# Patient Record
Sex: FEMALE | Race: WHITE | Employment: FULL TIME | URBAN - METROPOLITAN AREA
[De-identification: names, ages, dates, MRNs, and addresses within clinical notes are randomized per-mention and may not be internally consistent; named-entity substitution may affect disease eponyms.]

---

## 2017-08-03 ENCOUNTER — OFFICE VISIT (OUTPATIENT)
Dept: ORTHOPEDICS | Facility: CLINIC | Age: 32
End: 2017-08-03

## 2017-08-03 VITALS — HEIGHT: 63 IN | BODY MASS INDEX: 22.68 KG/M2 | WEIGHT: 128 LBS

## 2017-08-03 DIAGNOSIS — M22.2X2 PATELLOFEMORAL PAIN SYNDROME OF LEFT KNEE: Primary | ICD-10-CM

## 2017-08-03 NOTE — LETTER
Date:August 4, 2017      Patient was self referred, no letter generated. Do not send.        [facility] Health Information

## 2017-08-03 NOTE — LETTER
"  8/3/2017      RE: Mackenzie Echevarria  [address]    [facility] [address]       Sports Medicine Clinic Visit    PCP: No Ref-Primary, Physician    Mackenzie Echevarria is a 31 year old female who is seen  as self referral presenting with left knee pain.     Injury: She was on a run, two weeks ago, when her shoe came untied. She tried to bend down to tie it very quickly so she wouldn't lose her time on her GPS.  She had ant knee pain after the run.    Location of Pain: left knee  Duration of Pain: 2 week(s)  Pain is better with: Ice and Vicks  Pain is worse with: Pain is constant, worse with walking and stairs  Additional Features:   Treatment so far consists of: Ice and Vicks  Prior History of related problems:     Ht 5' 3\" (1.6 m)  Wt 128 lb (58.1 kg)  BMI 22.67 kg/m2         PMH:  No past medical history on file.    Active problem list:  There is no problem list on file for this patient.      FH:  No family history on file.    SH:  Social History     Social History     Marital status:      Spouse name: N/A     Number of children: N/A     Years of education: N/A     Occupational History     Not on file.     Social History Main Topics     Smoking status: Never Smoker     Smokeless tobacco: Never Used     Alcohol use Not on file     Drug use: Not on file     Sexual activity: Not on file     Other Topics Concern     Not on file     Social History Narrative       MEDS:  See EMR, reviewed  ALL:  See EMR, reviewed    REVIEW OF SYSTEMS:  CONSTITUTIONAL:NEGATIVE for fever, chills, change in weight  INTEGUMENTARY/SKIN: NEGATIVE for worrisome rashes, moles or lesions  EYES: NEGATIVE for vision changes or irritation  ENT/MOUTH: NEGATIVE for ear, mouth and throat problems  RESP:NEGATIVE for significant cough or SOB  BREAST: NEGATIVE for masses, tenderness or discharge  CV: NEGATIVE for chest pain, palpitations or peripheral edema  GI: NEGATIVE for nausea, abdominal pain, heartburn, or change in bowel " habits  :NEGATIVE for frequency, dysuria, or hematuria  :NEGATIVE for frequency, dysuria, or hematuria  NEURO: NEGATIVE for weakness, dizziness or paresthesias  ENDOCRINE: NEGATIVE for temperature intolerance, skin/hair changes  HEME/ALLERGY/IMMUNE: NEGATIVE for bleeding problems  PSYCHIATRIC: NEGATIVE for changes in mood or affect      Subjective this 31-year-old female has anterior knee discomfort associated with the run she did 2 weeks ago. During the the run she had to deal with and untied shoe and was bending down quickly as she was not wanting to lose time on her GPS. Her anterior knee heard after the run. She runs a few mildly time a few days a week. She has not had injuries associated with this knee in the past. The knee has not been swollen. She feels discomfort when she goes up and down stairs. In 2 weeks she goes to [facility] to get  and she is hoping to dance at her wedding without knee discomfort.    Objective. The left knee appears normal.. She can do a full squat. But she is tender consistently over the inferior lateral patellar facet and non-tender over the opposite knee patellar facets. Patellar excursion is without discomfort and apprehension signs are negative. She has a symmetrical amount of medial lateral patellar excursion. She is nontender over the medial or lateral joint line. Nontender over the patellar tendon pes anserine bursa or distal IT band. Lachman's is negative. MCL and LCL stresses are negative. Anterior and posterior drawer is negative. Normal range of motion of the hip.    Assessment: Patellofemoral discomfort    Plan: She seems specifically tender over the inferior lateral patellar facets consistently on exam. Subpatellar taping might be helpful for her upcoming travel and upcoming wedding. Patellofemoral alignment exercises may also be helpful. Localized ice, localized massage, and patellar mobilization may also be helpful. These were explained to her. She'll follow-up  if not improved for imaging studies and a repeat exam. Activity modification was outlined.                  Walker Hamm MD

## 2017-08-03 NOTE — PROGRESS NOTES
"Sports Medicine Clinic Visit    PCP: No Ref-Primary, Physician    Mackenzie Echevarria is a 31 year old female who is seen  as self referral presenting with left knee pain.     Injury: She was on a run, two weeks ago, when her shoe came untied. She tried to bend down to tie it very quickly so she wouldn't lose her time on her GPS.  She had ant knee pain after the run.    Location of Pain: left knee  Duration of Pain: 2 week(s)  Pain is better with: Ice and Vicks  Pain is worse with: Pain is constant, worse with walking and stairs  Additional Features:   Treatment so far consists of: Ice and Vicks  Prior History of related problems:     Ht 5' 3\" (1.6 m)  Wt 128 lb (58.1 kg)  BMI 22.67 kg/m2         PMH:  No past medical history on file.    Active problem list:  There is no problem list on file for this patient.      FH:  No family history on file.    SH:  Social History     Social History     Marital status:      Spouse name: N/A     Number of children: N/A     Years of education: N/A     Occupational History     Not on file.     Social History Main Topics     Smoking status: Never Smoker     Smokeless tobacco: Never Used     Alcohol use Not on file     Drug use: Not on file     Sexual activity: Not on file     Other Topics Concern     Not on file     Social History Narrative       MEDS:  See EMR, reviewed  ALL:  See EMR, reviewed    REVIEW OF SYSTEMS:  CONSTITUTIONAL:NEGATIVE for fever, chills, change in weight  INTEGUMENTARY/SKIN: NEGATIVE for worrisome rashes, moles or lesions  EYES: NEGATIVE for vision changes or irritation  ENT/MOUTH: NEGATIVE for ear, mouth and throat problems  RESP:NEGATIVE for significant cough or SOB  BREAST: NEGATIVE for masses, tenderness or discharge  CV: NEGATIVE for chest pain, palpitations or peripheral edema  GI: NEGATIVE for nausea, abdominal pain, heartburn, or change in bowel habits  :NEGATIVE for frequency, dysuria, or hematuria  :NEGATIVE for frequency, dysuria, or " hematuria  NEURO: NEGATIVE for weakness, dizziness or paresthesias  ENDOCRINE: NEGATIVE for temperature intolerance, skin/hair changes  HEME/ALLERGY/IMMUNE: NEGATIVE for bleeding problems  PSYCHIATRIC: NEGATIVE for changes in mood or affect      Subjective this 31-year-old female has anterior knee discomfort associated with the run she did 2 weeks ago. During the the run she had to deal with and untied shoe and was bending down quickly as she was not wanting to lose time on her GPS. Her anterior knee heard after the run. She runs a few mildly time a few days a week. She has not had injuries associated with this knee in the past. The knee has not been swollen. She feels discomfort when she goes up and down stairs. In 2 weeks she goes to [facility] to get  and she is hoping to dance at her wedding without knee discomfort.    Objective. The left knee appears normal.. She can do a full squat. But she is tender consistently over the inferior lateral patellar facet and non-tender over the opposite knee patellar facets. Patellar excursion is without discomfort and apprehension signs are negative. She has a symmetrical amount of medial lateral patellar excursion. She is nontender over the medial or lateral joint line. Nontender over the patellar tendon pes anserine bursa or distal IT band. Lachman's is negative. MCL and LCL stresses are negative. Anterior and posterior drawer is negative. Normal range of motion of the hip.    Assessment: Patellofemoral discomfort    Plan: She seems specifically tender over the inferior lateral patellar facets consistently on exam. Subpatellar taping might be helpful for her upcoming travel and upcoming wedding. Patellofemoral alignment exercises may also be helpful. Localized ice, localized massage, and patellar mobilization may also be helpful. These were explained to her. She'll follow-up if not improved for imaging studies and a repeat exam. Activity modification was  outlined.

## 2017-08-03 NOTE — MR AVS SNAPSHOT
After Visit Summary   8/3/2017    Mackenzie Echevarria    MRN: [id number]           Patient Information     Date Of Birth          1985        Visit Information        Provider Department      8/3/2017 3:30 PM Walker Hamm MD [facility] Sports Medicine        Today's Diagnoses     Patellofemoral pain syndrome of left knee    -  1       Follow-ups after your visit        Additional Services     PHYSICAL THERAPY REFERRAL (Internal)       Physical Therapy Referral                  Who to contact     Please call your clinic at [phone] to:    Ask questions about your health    Make or cancel appointments    Discuss your medicines    Learn about your test results    Speak to your doctor   If you have compliments or concerns about an experience at your clinic, or if you wish to file a complaint, please contact [facility] Patient Relations at [phone] or email us at [email or web address]         Additional Information About Your Visit        MyChart Information     [employer] is an electronic gateway that provides easy, online access to your medical records. With [employer], you can request a clinic appointment, read your test results, renew a prescription or communicate with your care team.     To sign up for [employer] visit the website at [email or web address]   You will be asked to enter the access code listed below, as well as some personal information. Please follow the directions to create your username and password.     Your access code is: [email or web address]  Expires: 2017  6:31 AM     Your access code will  in 90 days. If you need help or a new code, please contact your [facility] or call [phone] for assistance.        Care EveryWhere ID     This is your Care EveryWhere ID. This could be used by other organizations to access your [place] medical records  [id number]        Your Vitals Were     Height BMI (Body  "Mass Index)                5' 3\" (1.6 m) 22.67 kg/m2           Blood Pressure from Last 3 Encounters:   07/08/14 123/68    Weight from Last 3 Encounters:   08/03/17 128 lb (58.1 kg)              We Performed the Following     PHYSICAL THERAPY REFERRAL (Internal)        Primary Care Provider    Physician No Ref-Primary       No address on file        Equal Access to Services     CANDIS GUZMANESTHELA : Hadii aad ku hadasho Soomaali, waaxda luqadaha, qaybta kaalmada aderadhayada, brett horn delvinjudith hernandez kevin lajosiejudith . So [facility] [phone].    ATENCIÓN: Si habla español, tiene a sears disposición servicios gratuitos de asistencia lingüística. Llame al [phone].    We comply with applicable federal civil rights laws and [place] laws. We do not discriminate on the basis of race, color, national origin, age, disability sex, sexual orientation or gender identity.            Thank you!     Thank you for choosing [facility]  for your care. Our goal is always to provide you with excellent care. Hearing back from our patients is one way we can continue to improve our services. Please take a few minutes to complete the written survey that you may receive in the mail after your visit with us. Thank you!             Your Updated Medication List - Protect others around you: Learn how to safely use, store and throw away your medicines at www.disposemymeds.org.          This list is accurate as of: 8/3/17  3:56 PM.  Always use your most recent med list.                   Brand Name Dispense Instructions for use Diagnosis    ibuprofen 600 MG tablet    ADVIL/MOTRIN    20 tablet    Take 1 tablet (600 mg) by mouth every 6 hours as needed for moderate pain          "

## 2017-08-04 ENCOUNTER — THERAPY VISIT (OUTPATIENT)
Dept: PHYSICAL THERAPY | Facility: CLINIC | Age: 32
End: 2017-08-04
Payer: COMMERCIAL

## 2017-08-04 DIAGNOSIS — M25.562 LEFT KNEE PAIN: Primary | ICD-10-CM

## 2017-08-04 PROCEDURE — 97110 THERAPEUTIC EXERCISES: CPT | Mod: GP | Performed by: PHYSICAL THERAPIST

## 2017-08-04 PROCEDURE — 97161 PT EVAL LOW COMPLEX 20 MIN: CPT | Mod: GP | Performed by: PHYSICAL THERAPIST

## 2017-08-04 PROCEDURE — 97112 NEUROMUSCULAR REEDUCATION: CPT | Mod: GP | Performed by: PHYSICAL THERAPIST

## 2017-08-04 NOTE — PROGRESS NOTES
[facility] Initial Evaluation  Physical Therapy Initial Examination/Evaluation  August 4, 2017    Mackenzie Echevarria is a 31 year old  female referred to physical therapy by Dr. Hamm for treatment of left knee pain with Precautions/Restrictions/MD instructions eval and treat    Subjective:  Referring MD visit date: 8/3/17  DOI/onset: July 2017  Mechanism of injury:  Running when attempted to stop quickly to tie shoes ultimately resulting pain in left anterior knee  DOS N/A  Previous treatment: None  Imaging: None  Chief Complaint:   Left anterior knee pain that increases with various activities including ambulation, stair climbing, squatting, and recreational activities such as running   Pain: rest 4 /10, activity 8/10 with running Described as: ache sometimes sharp Alleviated by: rest, inactivity Frequency: intermittent Progression of symptoms since initial onset: better Time of day when pain is worse: day  Sleeping: No complaints  Social history:  Enjoys running recreationally  Occupation: Business insights and analytic consultant  Job duties:  Computer work    Current HEP/exercise regimen: Pelvifemoral strengthening program  Patient's goals are Decrease left anterior knee pain, return to prior level of function without pain, return to running without pain    Pertinent PMH: migraines/headaches   General Health Reported by Patient: excellent  Return to MD:  6 weeks if no improvement        HPI                    Objective:      Gait:    Gait Type:  Normal                                                      Hip Evaluation    Hip Strength:    Flexion:   Left: 5/5   Pain:  Right: 5/5   Pain:                    Extension:  Left: 4+/5  Pain:Right: 4+/5    Pain:    Abduction:  Right: 4+/5    Pain:  Adduction:  Left: 4/5    Pain:                         Knee Evaluation:  ROM:  AROM: normal            Strength:     Extension:  Left: 4+/5   Pain:      Right: 4+/5   Pain:  Flexion:  Left: 4+/5   Pain:       Right: 4+/5   Pain:        Ligament Testing:  Normal                  Palpation:    Left knee tenderness present at:  Medial Joint Line and Patellar Inferior    Right knee tenderness not present at:  Medial Joint Line and Patellar Inferior      Functional Testing:          Quad:    Single Leg Squat:  Left:       50%  Moderate loss of control, femoral IR and excessive anterior knee excursion  Right:      66%  Mild loss of control and femoral IR  Bilateral Leg Squat:                General     ROS    Assessment/Plan:      Patient is a 31 year old female with left side knee complaints.    Patient has the following significant findings with corresponding treatment plan.                Diagnosis 1:  Left knee pain  Pain -  manual therapy, splint/taping/bracing/orthotics, self management, education and home program  Decreased strength - therapeutic exercise, therapeutic activities and home program  Impaired balance - neuro re-education, therapeutic activities and home program  Decreased proprioception - neuro re-education, therapeutic activities and home program    Therapy Evaluation Codes:   1) History comprised of:   Personal factors that impact the plan of care:      None.    Comorbidity factors that impact the plan of care are:      Migraines/headaches.     Medications impacting care: None.  2) Examination of Body Systems comprised of:   Body structures and functions that impact the plan of care:      Knee.   Activity limitations that impact the plan of care are:      Running, Sports, Squatting/kneeling, Stairs and Walking.  3) Clinical presentation characteristics are:   Stable/Uncomplicated.  4) Decision-Making    Low complexity using standardized patient assessment instrument and/or measureable assessment of functional outcome.  Cumulative Therapy Evaluation is: Low complexity.    Previous and current functional limitations:  (See Goal Flow Sheet for this information)    Short term and Long term goals: (See Goal  Flow Sheet for this information)     Communication ability:  Patient appears to be able to clearly communicate and understand verbal and written communication and follow directions correctly.  Treatment Explanation - The following has been discussed with the patient:   RX ordered/plan of care  Anticipated outcomes  Possible risks and side effects  This patient would benefit from PT intervention to resume normal activities.   Rehab potential is good.    Frequency:  1 X week, once daily  Duration:  for 6 weeks  Discharge Plan:  Achieve all LTG.  Independent in home treatment program.  Reach maximal therapeutic benefit.    Please refer to the daily flowsheet for treatment today, total treatment time and time spent performing 1:1 timed codes.

## 2017-08-04 NOTE — MR AVS SNAPSHOT
"              After Visit Summary   8/4/2017    Mackenzie Echevarria    MRN: [id number]           Patient Information     Date Of Birth          1985        Visit Information        Provider Department      8/4/2017 11:00 AM Randy Del Real PT [facility] [place]        Today's Diagnoses     Left knee pain    -  1       Follow-ups after your visit        Your next 10 appointments already scheduled     Aug 11, 2017  9:40 AM CDT   KARMA Extremity with Randy Del Real PT   [place] [facility] [place] ([address] [phone] [phone]              Who to contact     If you have questions or need follow up information about today's clinic visit or your schedule please contact [facility] directly at [phone].  Normal or non-critical lab and imaging results will be communicated to you by [employer]hart, letter or phone within 4 business days after the clinic has received the results. If you do not hear from us within 7 days, please contact the clinic through MyChart or phone. If you have a critical or abnormal lab result, we will notify you by phone as soon as possible.  Submit refill requests through [employer] or call your pharmacy and they will forward the refill request to us. Please allow 3 business days for your refill to be completed.          Additional Information About Your Visit        [employer] Information     [employer] lets you send messages to your doctor, view your test results, renew your prescriptions, schedule appointments and more. To sign up, go to [email or web address] . Click on \"Log in\" on the left side of the screen, which will take you to the Welcome page. Then click on \"Sign up Now\" on the right side of the page.     You will be asked to enter the access code listed below, as well as some personal information. Please follow the directions to create your username and " password.     Your access code is: [email or web address]  Expires: 2017  6:31 AM     Your access code will  in 90 days. If you need help or a new code, please call your [facility] or [phone].        Care EveryWhere ID     This is your Care EveryWhere ID. This could be used by other organizations to access your [place] medical records  [id number]         Blood Pressure from Last 3 Encounters:   14 123/68    Weight from Last 3 Encounters:   17 58.1 kg (128 lb)              We Performed the Following     HC PT EVAL, LOW COMPLEXITY     KARMA INITIAL EVAL REPORT     NEUROMUSCULAR RE-EDUCATION     THERAPEUTIC EXERCISES        Primary Care Provider    Physician No Ref-Primary       No address on file        Equal Access to Services     RUBÉN CASTAÑEDA : Hadii luis carlos polancoo Kelly, waaxda luqadaha, qaybta kaalmada adeegyada, waxay tamikoin simone brandt . So [facility] [phone].    ATENCIÓN: Si habla español, tiene a sears disposición servicios gratuitos de asistencia lingüística. Llame al [phone].    We comply with applicable federal civil rights laws and [place] laws. We do not discriminate on the basis of race, color, national origin, age, disability sex, sexual orientation or gender identity.            Thank you!     Thank you for choosing [facility] [place]  for your care. Our goal is always to provide you with excellent care. Hearing back from our patients is one way we can continue to improve our services. Please take a few minutes to complete the written survey that you may receive in the mail after your visit with us. Thank you!             Your Updated Medication List - Protect others around you: Learn how to safely use, store and throw away your medicines at www.disposemymeds.org.          This list is accurate as of: 17 11:59 PM.  Always use your most recent med list.                   Brand Name Dispense Instructions for use Diagnosis     ibuprofen 600 MG tablet    ADVIL/MOTRIN    20 tablet    Take 1 tablet (600 mg) by mouth every 6 hours as needed for moderate pain

## 2017-08-11 ENCOUNTER — THERAPY VISIT (OUTPATIENT)
Dept: PHYSICAL THERAPY | Facility: CLINIC | Age: 32
End: 2017-08-11
Payer: COMMERCIAL

## 2017-08-11 DIAGNOSIS — M25.562 LEFT KNEE PAIN: ICD-10-CM

## 2017-08-11 PROCEDURE — 97110 THERAPEUTIC EXERCISES: CPT | Mod: GP | Performed by: PHYSICAL THERAPIST

## 2017-08-11 PROCEDURE — 97112 NEUROMUSCULAR REEDUCATION: CPT | Mod: GP | Performed by: PHYSICAL THERAPIST

## 2017-08-14 NOTE — PROGRESS NOTES
Subjective:    Patient is a 31 year old female presenting with rehab left ankle/foot hpi.                    and reported as 8/10.                General health as reported by patient is excellent.  Pertinent medical history includes:  Migraines.        Current occupation is Business Insights and Analytic Consultant.    Employment tasks: computer work.                                Objective:    System    Physical Exam    General     ROS    Assessment/Plan:

## 2018-09-06 ENCOUNTER — HOSPITAL ENCOUNTER (EMERGENCY)
Facility: CLINIC | Age: 33
Discharge: HOME | End: 2018-09-06
Attending: FAMILY MEDICINE | Admitting: FAMILY MEDICINE
Payer: COMMERCIAL

## 2018-09-06 VITALS
DIASTOLIC BLOOD PRESSURE: 72 MMHG | TEMPERATURE: 97.8 F | HEART RATE: 65 BPM | WEIGHT: 127.6 LBS | RESPIRATION RATE: 16 BRPM | HEIGHT: 63 IN | SYSTOLIC BLOOD PRESSURE: 119 MMHG | BODY MASS INDEX: 22.61 KG/M2 | OXYGEN SATURATION: 98 %

## 2018-09-06 DIAGNOSIS — S61.412A LACERATION OF LEFT HAND WITHOUT FOREIGN BODY, INITIAL ENCOUNTER: ICD-10-CM

## 2018-09-06 PROCEDURE — 99282 EMERGENCY DEPT VISIT SF MDM: CPT | Mod: 25 | Performed by: FAMILY MEDICINE

## 2018-09-06 PROCEDURE — 99283 EMERGENCY DEPT VISIT LOW MDM: CPT | Mod: 25

## 2018-09-06 PROCEDURE — 12001 RPR S/N/AX/GEN/TRNK 2.5CM/<: CPT

## 2018-09-06 PROCEDURE — 12001 RPR S/N/AX/GEN/TRNK 2.5CM/<: CPT | Mod: Z6 | Performed by: FAMILY MEDICINE

## 2018-09-06 RX ORDER — LIDOCAINE HYDROCHLORIDE 10 MG/ML
INJECTION, SOLUTION EPIDURAL; INFILTRATION; INTRACAUDAL; PERINEURAL
Status: DISCONTINUED
Start: 2018-09-06 | End: 2018-09-06 | Stop reason: HOSPADM

## 2018-09-06 ASSESSMENT — ENCOUNTER SYMPTOMS
NUMBNESS: 0
WOUND: 1
WEAKNESS: 0

## 2018-09-06 NOTE — ED AVS SNAPSHOT
[facility], Emergency Department    [address]    [place] [address]    Phone:  [phone]    Fax:  [phone]                                       Mackenzie Echevarria   MRN: [id number]    Department:  [facility], Emergency Department   Date of Visit:  9/6/2018           Patient Information     Date Of Birth          1985        Your diagnoses for this visit were:     Laceration of left hand without foreign body, initial encounter        You were seen by Kt Crowell MD.      Follow-up Information     Follow up with Follow-up with your primary MD for further evaluation and suture removal in 7-10 days.        Discharge Instructions       Discharged home with dressing in place and plans to follow-up with your outpatient clinic for suture removal in 7-10 days.    24 Hour Appointment Hotline       To make an appointment at any [facility], call [phone] ([phone]). If you don't have a family doctor or clinic, we will help you find one. [facility] clinics are conveniently located to serve the needs of you and your family.             Review of your medicines      Our records show that you are taking the medicines listed below. If these are incorrect, please call your family doctor or clinic.        Dose / Directions Last dose taken    ibuprofen 600 MG tablet   Commonly known as:  ADVIL/MOTRIN   Dose:  600 mg   Quantity:  20 tablet        Take 1 tablet (600 mg) by mouth every 6 hours as needed for moderate pain   Refills:  0                Orders Needing Specimen Collection     None      Pending Results     No orders found from 9/4/2018 to 9/7/2018.            Pending Culture Results     No orders found from 9/4/2018 to 9/7/2018.            Pending Results Instructions     If you had any lab results that were not finalized at the time of your Discharge, you can call the ED Lab Result RN at [phone]. You will be contacted by this team for any positive Lab results or  "changes in treatment. The nurses are available 7 days a week from 10A to 6:30P.  You can leave a message 24 hours per day and they will return your call.        Thank you for choosing [facility]       Thank you for choosing [facility] for your care. Our goal is always to provide you with excellent care. Hearing back from our patients is one way we can continue to improve our services. Please take a few minutes to complete the written survey that you may receive in the mail after you visit with us. Thank you!        [employer] Information     [employer] lets you send messages to your doctor, view your test results, renew your prescriptions, schedule appointments and more. To sign up, go to [email or web address] . Click on \"Log in\" on the left side of the screen, which will take you to the Welcome page. Then click on \"Sign up Now\" on the right side of the page.     You will be asked to enter the access code listed below, as well as some personal information. Please follow the directions to create your username and password.     Your access code is: [email or web address]  Expires: 2018  9:36 PM     Your access code will  in 90 days. If you need help or a new code, please call your [facility] clinic or [phone].        Care EveryWhere ID     This is your Care EveryWhere ID. This could be used by other organizations to access your [facility] medical records  [id number]        Equal Access to Services     RUBÉN CASTAÑEDA : Hadii luis carlos Mendoza, waaxda wili, qaybta kaalmavielka stratton, brett brandt . So [facility] [phone].    ATENCIÓN: Si habla español, tiene a sears disposición servicios gratuitos de asistencia lingüística. Llame al [phone].    We comply with applicable federal civil rights laws and [place] laws. We do not discriminate on the basis of race, color, national origin, age, disability, sex, sexual orientation, or gender identity.            After Visit Summary       This is " your record. Keep this with you and show to your community pharmacist(s) and doctor(s) at your next visit.

## 2018-09-06 NOTE — ED AVS SNAPSHOT
[facility], [place], Emergency Department    [address]    [facility] [address]    Phone:  [phone]    Fax:  [phone]                                       Mackenzie Echevarria   MRN: [id number]    Department:  [facility], Emergency Department   Date of Visit:  9/6/2018           After Visit Summary Signature Page     I have received my discharge instructions, and my questions have been answered. I have discussed any challenges I see with this plan with the nurse or doctor.    ..........................................................................................................................................  Patient/Patient Representative Signature      ..........................................................................................................................................  Patient Representative Print Name and Relationship to Patient    ..................................................               ................................................  Date                                            Time    ..........................................................................................................................................  Reviewed by Signature/Title    ...................................................              ..............................................  Date                                                            Time          22EPIC Rev 08/18

## 2018-09-07 NOTE — ED PROVIDER NOTES
"  History     Chief Complaint   Patient presents with     Laceration     drying dishes an hour ago, incurred laceration in between L ringer. Pt went to Urgent Care, was told to come to ER \"it might be the tendon\" bleeding not stopping     HPI  Macknezie Echevarria is a 32 year old female who presents from urgent care for evaluation of laceration.  The patient reports that she was washing dishes, when she picked up a one glass, which then shattered in her left hand.  She notes that she incurred a small laceration to the palmar aspect of her left hand, between the bases of digits 3 and 4.  The patient denies history of coagulopathy.  The patient states that she does not recall when she last received tetanus vaccination; however, the [place] Immunization Information Connection database was reviewed and this shows that she last received it and 7/8/14.  The patient states that she was seen in urgent care setting for this injury just prior to arrival and states that she was advised to come to the ER to undergo further evaluation to rule out possible tendon injury.  She herself is denying any numbness or weakness of the hand at this time.      No current facility-administered medications for this encounter.      Current Outpatient Prescriptions   Medication     ibuprofen (ADVIL,MOTRIN) 600 MG tablet     History reviewed. No pertinent past medical history.    History reviewed. No pertinent surgical history.    No family history on file.    Social History   Substance Use Topics     Smoking status: Never Smoker     Smokeless tobacco: Never Used     Alcohol use Yes      Comment: social     No Known Allergies    I have reviewed the Medications, Allergies, Past Medical and Surgical History, and Social History in the Epic system.    Review of Systems   Skin: Positive for wound (lac to left palm, see HPI).   Neurological: Negative for weakness and numbness.       Physical Exam   BP: 121/80  Pulse: 63  Temp: 97.4  F (36.3  C)  Resp: " "16  Height: 160 cm (5' 3\")  Weight: 57.9 kg (127 lb 9.6 oz)  SpO2: 100 %      Physical Exam   Constitutional: No distress.   HENT:   Head: Atraumatic.   Mouth/Throat: Oropharynx is clear and moist. No oropharyngeal exudate.   Eyes: Pupils are equal, round, and reactive to light. No scleral icterus.   Cardiovascular: Normal heart sounds and intact distal pulses.    Pulmonary/Chest: Breath sounds normal. No respiratory distress.   Abdominal: Soft. Bowel sounds are normal. There is no tenderness.   Musculoskeletal: She exhibits no edema or tenderness.   Skin: Skin is warm. Laceration noted. No rash noted. She is not diaphoretic.   2 center laceration at the base of the ring finger left hand.       ED Course     ED Course     Procedures     [facility] Procedure Note        Laceration Repair:    Performed by: Kt Crowell  Authorized by: Kt Crowell  Consent given by: Patient who states understanding of the procedure being performed after discussing the risks, benefits and alternatives.  Medical student performed repair under my supervision.    Preparation: Patient was prepped and draped in usual sterile fashion.  Irrigation solution: saline    Body area: ring finger  Laceration length: 2cm  Contamination: The wound is not contaminated.  Foreign bodies:none  Tendon involvement: none  Anesthesia: Local  Local anesthetic: Bupivacaine 0.5%, Lidocaine     1%  Anesthetic total: 4ml    Debridement: none  Skin closure: Closed with 4 x 5.0 Ethilon  Technique: interrupted  Approximation: close  Approximation difficulty: simple    Patient tolerance: Patient tolerated the procedure well with no immediate complications.    Critical Care time:  none         Assessments & Plan (with Medical Decision Making)     I have reviewed the nursing notes.    I have reviewed the findings, diagnosis, plan and need for follow up with the patient.  Patient with laceration left ring finger now status post suturing " tolerating procedure well no complications patient will be discharged home and follow-up with primary for suture removal in 7-10 days.      Final diagnoses:   Laceration of left hand without foreign body, initial encounter     IRoly, am serving as a trained medical scribe to document services personally performed by Kt Crowell MD, based on the provider's statements to me.      Kt PADRON MD, was physically present and have reviewed and verified the accuracy of this note documented by Roly Cortez.    9/6/2018   [facility], EMERGENCY DEPARTMENT     Kt Crowell MD  09/09/18 0996

## 2018-09-07 NOTE — DISCHARGE INSTRUCTIONS
Discharged home with dressing in place and plans to follow-up with your outpatient clinic for suture removal in 7-10 days.